# Patient Record
Sex: FEMALE | Race: WHITE | NOT HISPANIC OR LATINO | Employment: UNEMPLOYED | URBAN - METROPOLITAN AREA
[De-identification: names, ages, dates, MRNs, and addresses within clinical notes are randomized per-mention and may not be internally consistent; named-entity substitution may affect disease eponyms.]

---

## 2017-06-14 ENCOUNTER — INITIAL PRENATAL (OUTPATIENT)
Dept: OBSTETRICS AND GYNECOLOGY | Facility: CLINIC | Age: 27
End: 2017-06-14

## 2017-06-14 ENCOUNTER — PROCEDURE VISIT (OUTPATIENT)
Dept: OBSTETRICS AND GYNECOLOGY | Facility: CLINIC | Age: 27
End: 2017-06-14

## 2017-06-14 VITALS — SYSTOLIC BLOOD PRESSURE: 98 MMHG | DIASTOLIC BLOOD PRESSURE: 70 MMHG | WEIGHT: 169.8 LBS

## 2017-06-14 DIAGNOSIS — O36.80X0 ENCOUNTER TO DETERMINE FETAL VIABILITY OF PREGNANCY, NOT APPLICABLE OR UNSPECIFIED FETUS: Primary | ICD-10-CM

## 2017-06-14 DIAGNOSIS — Z34.82 PRENATAL CARE, SUBSEQUENT PREGNANCY, SECOND TRIMESTER: Primary | ICD-10-CM

## 2017-06-14 DIAGNOSIS — Z3A.13 13 WEEKS GESTATION OF PREGNANCY: ICD-10-CM

## 2017-06-14 PROCEDURE — 99204 OFFICE O/P NEW MOD 45 MIN: CPT | Performed by: OBSTETRICS & GYNECOLOGY

## 2017-06-14 PROCEDURE — 76801 OB US < 14 WKS SINGLE FETUS: CPT | Performed by: OBSTETRICS & GYNECOLOGY

## 2017-06-14 RX ORDER — VITAMIN A, ASCORBIC ACID, VITAMIN D, .ALPHA.-TOCOPHEROL, THIAMINE MONONITRATE, RIBOFLAVIN, NIACIN, PYRIDOXINE HYDROCHLORIDE, FOLIC ACID, CYANOCOBALAMIN, IODINE, IRON, MAGNESIUM, ZINC, COPPER, DOCONEXENT, AND DOCUSATE SODIUM
1 KIT DAILY
Qty: 30 EACH | Refills: 11 | Status: SHIPPED | OUTPATIENT
Start: 2017-06-14 | End: 2017-08-04 | Stop reason: ALTCHOICE

## 2017-06-14 NOTE — PROGRESS NOTES
CC: Ms. Dallas is a 27-year-old  3 para 2 who presents at 13-6/7 weeks for an initial OB visit    Assessment and plan    1.  Initial OB visit.  Counseled regarding weight gain, genetic screening, avoidance of Zika virus.  30 minutes of a 45 minute visit were spent in direct face-to-face counseling  2 the patient would like to do genetic screening.  She would like to defer this screening as well as her prenatal labs to the next visit.  3.  Delivery note from the previous delivery was reviewed.

## 2017-06-16 LAB
Lab: NORMAL
REQUEST PROBLEM: NORMAL

## 2017-06-21 LAB
C TRACH RRNA CVX QL NAA+PROBE: NEGATIVE
CYTOLOGIST CVX/VAG CYTO: ABNORMAL
CYTOLOGY CVX/VAG DOC THIN PREP: ABNORMAL
DX ICD CODE: ABNORMAL
DX ICD CODE: ABNORMAL
HIV 1 & 2 AB SER-IMP: ABNORMAL
HPV I/H RISK 4 DNA CVX QL PROBE+SIG AMP: POSITIVE
N GONORRHOEA RRNA CVX QL NAA+PROBE: NEGATIVE
OTHER STN SPEC: ABNORMAL
PATH REPORT.FINAL DX SPEC: ABNORMAL
PATHOLOGIST CVX/VAG CYTO: ABNORMAL
RECOM F/U CVX/VAG CYTO: ABNORMAL
STAT OF ADQ CVX/VAG CYTO-IMP: ABNORMAL
T VAGINALIS RRNA SPEC QL NAA+PROBE: NEGATIVE

## 2017-06-23 ENCOUNTER — TELEPHONE (OUTPATIENT)
Dept: OBSTETRICS AND GYNECOLOGY | Facility: CLINIC | Age: 27
End: 2017-06-23

## 2017-06-23 NOTE — TELEPHONE ENCOUNTER
----- Message from Rolando Cunha MD sent at 6/22/2017 12:15 PM EDT -----  The patient needs a colposcopy.  I will discuss this with her at her next OB visit.

## 2017-06-28 ENCOUNTER — TELEPHONE (OUTPATIENT)
Dept: OBSTETRICS AND GYNECOLOGY | Facility: CLINIC | Age: 27
End: 2017-06-28

## 2017-07-18 ENCOUNTER — ROUTINE PRENATAL (OUTPATIENT)
Dept: OBSTETRICS AND GYNECOLOGY | Facility: CLINIC | Age: 27
End: 2017-07-18

## 2017-07-18 VITALS — WEIGHT: 175.8 LBS | SYSTOLIC BLOOD PRESSURE: 110 MMHG | DIASTOLIC BLOOD PRESSURE: 70 MMHG

## 2017-07-18 DIAGNOSIS — Z34.82 PRENATAL CARE, SUBSEQUENT PREGNANCY, SECOND TRIMESTER: Primary | ICD-10-CM

## 2017-07-18 LAB
BILIRUB BLD-MCNC: NEGATIVE MG/DL
GLUCOSE UR STRIP-MCNC: NEGATIVE MG/DL
KETONES UR QL: NEGATIVE
LEUKOCYTE EST, POC: NEGATIVE
NITRITE UR-MCNC: NEGATIVE MG/ML
PH UR: 7.5 [PH] (ref 5–8)
PROT UR STRIP-MCNC: NEGATIVE MG/DL
RBC # UR STRIP: NEGATIVE /UL
SP GR UR: 1 (ref 1–1.03)
UROBILINOGEN UR QL: NORMAL

## 2017-07-18 PROCEDURE — 99212 OFFICE O/P EST SF 10 MIN: CPT | Performed by: OBSTETRICS & GYNECOLOGY

## 2017-07-18 NOTE — PROGRESS NOTES
CC: Ms. Dallas is a 26-year-old  3 para 2 who presents for a routine prenatal visit at 18-5/7 weeks.  She is feeling well.  She has begun to appreciate fetal movement.    Assessment and plan 1.  Intrauterine pregnancy at 18-5/7 weeks.  Appropriate progress  2.  Screening ultrasound at the next visit  3.  Prenatal labs today  4.  The patient has changed her mind and now declines genetic screening.  Counseled.

## 2017-07-19 LAB
ABO GROUP BLD: (no result)
BASOPHILS # BLD AUTO: 0.1 X10E3/UL (ref 0–0.2)
BASOPHILS NFR BLD AUTO: 1 %
BLD GP AB SCN SERPL QL: NEGATIVE
EOSINOPHIL # BLD AUTO: 0.5 X10E3/UL (ref 0–0.4)
EOSINOPHIL NFR BLD AUTO: 6 %
ERYTHROCYTE [DISTWIDTH] IN BLOOD BY AUTOMATED COUNT: 13.3 % (ref 12.3–15.4)
HBV SURFACE AG SERPL QL IA: NEGATIVE
HCT VFR BLD AUTO: 32.5 % (ref 34–46.6)
HGB BLD-MCNC: 10.5 G/DL (ref 11.1–15.9)
HIV 1+2 AB+HIV1 P24 AG SERPL QL IA: NON REACTIVE
IMM GRANULOCYTES # BLD: 0 X10E3/UL (ref 0–0.1)
IMM GRANULOCYTES NFR BLD: 0 %
LYMPHOCYTES # BLD AUTO: 2.5 X10E3/UL (ref 0.7–3.1)
LYMPHOCYTES NFR BLD AUTO: 27 %
MCH RBC QN AUTO: 30.4 PG (ref 26.6–33)
MCHC RBC AUTO-ENTMCNC: 32.3 G/DL (ref 31.5–35.7)
MCV RBC AUTO: 94 FL (ref 79–97)
MONOCYTES # BLD AUTO: 0.6 X10E3/UL (ref 0.1–0.9)
MONOCYTES NFR BLD AUTO: 6 %
NEUTROPHILS # BLD AUTO: 5.6 X10E3/UL (ref 1.4–7)
NEUTROPHILS NFR BLD AUTO: 60 %
PLATELET # BLD AUTO: 373 X10E3/UL (ref 150–379)
RBC # BLD AUTO: 3.45 X10E6/UL (ref 3.77–5.28)
RH BLD: POSITIVE
RPR SER QL: NON REACTIVE
RUBV IGG SERPL IA-ACNC: 1.27 INDEX
WBC # BLD AUTO: 9.4 X10E3/UL (ref 3.4–10.8)

## 2017-08-04 ENCOUNTER — PROCEDURE VISIT (OUTPATIENT)
Dept: OBSTETRICS AND GYNECOLOGY | Facility: CLINIC | Age: 27
End: 2017-08-04

## 2017-08-04 ENCOUNTER — ROUTINE PRENATAL (OUTPATIENT)
Dept: OBSTETRICS AND GYNECOLOGY | Facility: CLINIC | Age: 27
End: 2017-08-04

## 2017-08-04 VITALS — SYSTOLIC BLOOD PRESSURE: 110 MMHG | WEIGHT: 183 LBS | DIASTOLIC BLOOD PRESSURE: 70 MMHG

## 2017-08-04 DIAGNOSIS — Z36.3 ANTENATAL SCREENING FOR MALFORMATION USING ULTRASONICS: Primary | ICD-10-CM

## 2017-08-04 DIAGNOSIS — Z34.82 PRENATAL CARE, SUBSEQUENT PREGNANCY, SECOND TRIMESTER: Primary | ICD-10-CM

## 2017-08-04 LAB
BILIRUB BLD-MCNC: NEGATIVE MG/DL
GLUCOSE UR STRIP-MCNC: NEGATIVE MG/DL
KETONES UR QL: NEGATIVE
PROT UR STRIP-MCNC: NEGATIVE MG/DL

## 2017-08-04 PROCEDURE — 76805 OB US >/= 14 WKS SNGL FETUS: CPT | Performed by: OBSTETRICS & GYNECOLOGY

## 2017-08-04 PROCEDURE — 99212 OFFICE O/P EST SF 10 MIN: CPT | Performed by: OBSTETRICS & GYNECOLOGY

## 2017-08-04 RX ORDER — MULTIVIT-MIN69/IRON/FOLIC ACID 50-1.25 MG
1 TABLET ORAL DAILY
Qty: 30 EACH | Refills: 11 | Status: SHIPPED | OUTPATIENT
Start: 2017-08-04

## 2017-08-04 NOTE — PROGRESS NOTES
CC: 21w1d IUP  Fetus active  VS reviewed and WNL  [place] screen reassuring  A: 2nd trimester IUP-stable  P: GTS next mo  Rx Prenate Elite

## 2017-09-25 ENCOUNTER — ROUTINE PRENATAL (OUTPATIENT)
Dept: OBSTETRICS AND GYNECOLOGY | Facility: CLINIC | Age: 27
End: 2017-09-25

## 2017-09-25 VITALS
BODY MASS INDEX: 31.49 KG/M2 | WEIGHT: 189 LBS | HEIGHT: 65 IN | SYSTOLIC BLOOD PRESSURE: 110 MMHG | DIASTOLIC BLOOD PRESSURE: 64 MMHG

## 2017-09-25 DIAGNOSIS — Z34.83 PRENATAL CARE, SUBSEQUENT PREGNANCY, THIRD TRIMESTER: Primary | ICD-10-CM

## 2017-09-25 PROCEDURE — 99212 OFFICE O/P EST SF 10 MIN: CPT | Performed by: OBSTETRICS & GYNECOLOGY

## 2017-09-26 LAB
BASOPHILS # BLD AUTO: 0.05 10*3/MM3 (ref 0–0.2)
BASOPHILS NFR BLD AUTO: 0.4 % (ref 0–1.5)
BLD GP AB SCN SERPL QL: NEGATIVE
EOSINOPHIL # BLD AUTO: 0.43 10*3/MM3 (ref 0–0.7)
EOSINOPHIL NFR BLD AUTO: 3.4 % (ref 0.3–6.2)
ERYTHROCYTE [DISTWIDTH] IN BLOOD BY AUTOMATED COUNT: 12.9 % (ref 11.7–13)
GLUCOSE 1H P 50 G GLC PO SERPL-MCNC: 120 MG/DL (ref 65–139)
HCT VFR BLD AUTO: 29.9 % (ref 35.6–45.5)
HGB BLD-MCNC: 10.1 G/DL (ref 11.9–15.5)
IMM GRANULOCYTES # BLD: 0.06 10*3/MM3 (ref 0–0.03)
IMM GRANULOCYTES NFR BLD: 0.5 % (ref 0–0.5)
LYMPHOCYTES # BLD AUTO: 2.88 10*3/MM3 (ref 0.9–4.8)
LYMPHOCYTES NFR BLD AUTO: 22.8 % (ref 19.6–45.3)
MCH RBC QN AUTO: 31.4 PG (ref 26.9–32)
MCHC RBC AUTO-ENTMCNC: 33.8 G/DL (ref 32.4–36.3)
MCV RBC AUTO: 92.9 FL (ref 80.5–98.2)
MONOCYTES # BLD AUTO: 0.78 10*3/MM3 (ref 0.2–1.2)
MONOCYTES NFR BLD AUTO: 6.2 % (ref 5–12)
NEUTROPHILS # BLD AUTO: 8.42 10*3/MM3 (ref 1.9–8.1)
NEUTROPHILS NFR BLD AUTO: 66.7 % (ref 42.7–76)
NRBC BLD AUTO-RTO: 0 /100 WBC (ref 0–0)
PLATELET # BLD AUTO: 378 10*3/MM3 (ref 140–500)
RBC # BLD AUTO: 3.22 10*6/MM3 (ref 3.9–5.2)
WBC # BLD AUTO: 12.62 10*3/MM3 (ref 4.5–10.7)

## 2017-11-02 ENCOUNTER — ROUTINE PRENATAL (OUTPATIENT)
Dept: OBSTETRICS AND GYNECOLOGY | Facility: CLINIC | Age: 27
End: 2017-11-02

## 2017-11-02 VITALS — DIASTOLIC BLOOD PRESSURE: 70 MMHG | BODY MASS INDEX: 33.28 KG/M2 | SYSTOLIC BLOOD PRESSURE: 120 MMHG | WEIGHT: 200 LBS

## 2017-11-02 DIAGNOSIS — Z34.83 SUPERVISION OF NORMAL INTRAUTERINE PREGNANCY IN MULTIGRAVIDA, THIRD TRIMESTER: Primary | ICD-10-CM

## 2017-11-02 PROCEDURE — 99212 OFFICE O/P EST SF 10 MIN: CPT | Performed by: OBSTETRICS & GYNECOLOGY

## 2017-11-02 NOTE — PROGRESS NOTES
CC: 34 wk IUP  Fetus active  No ctx  VS reviewed  11# wt gain-disc  A: 3rd trimester IUP  Excessive wt gain  P: disc diet  Growth scan 2 weeks

## 2017-11-12 ENCOUNTER — HOSPITAL ENCOUNTER (INPATIENT)
Facility: HOSPITAL | Age: 27
LOS: 2 days | Discharge: HOME | End: 2017-11-14
Attending: OBSTETRICS & GYNECOLOGY | Admitting: OBSTETRICS & GYNECOLOGY

## 2017-11-12 ENCOUNTER — ANESTHESIA EVENT (OUTPATIENT)
Dept: LABOR AND DELIVERY | Facility: HOSPITAL | Age: 27
End: 2017-11-12

## 2017-11-12 ENCOUNTER — ANESTHESIA (OUTPATIENT)
Dept: LABOR AND DELIVERY | Facility: HOSPITAL | Age: 27
End: 2017-11-12

## 2017-11-12 ENCOUNTER — APPOINTMENT (OUTPATIENT)
Dept: ULTRASOUND IMAGING | Facility: HOSPITAL | Age: 27
End: 2017-11-12

## 2017-11-12 PROBLEM — O09.33 INSUFFICIENT PRENATAL CARE IN THIRD TRIMESTER: Status: ACTIVE | Noted: 2017-11-12

## 2017-11-12 PROBLEM — O99.333 TOBACCO SMOKING AFFECTING PREGNANCY IN THIRD TRIMESTER: Status: ACTIVE | Noted: 2017-11-12

## 2017-11-12 PROBLEM — Z34.90 PREGNANCY: Status: RESOLVED | Noted: 2017-11-12 | Resolved: 2017-11-12

## 2017-11-12 PROBLEM — O99.323 DRUG USE AFFECTING PREGNANCY IN THIRD TRIMESTER: Status: ACTIVE | Noted: 2017-11-12

## 2017-11-12 PROBLEM — Z34.90 PREGNANCY: Status: ACTIVE | Noted: 2017-11-12

## 2017-11-12 PROBLEM — O46.93 VAGINAL BLEEDING IN PREGNANCY, THIRD TRIMESTER: Status: ACTIVE | Noted: 2017-11-12

## 2017-11-12 LAB
A1 MICROGLOB PLACENTAL VAG QL: POSITIVE
ABO GROUP BLD: NORMAL
AMPHET+METHAMPHET UR QL: NEGATIVE
ATMOSPHERIC PRESS: 759.4 MMHG
ATMOSPHERIC PRESS: 763.4 MMHG
BARBITURATES UR QL SCN: NEGATIVE
BASE EXCESS BLDCOA CALC-SCNC: -5.8 MMOL/L
BASE EXCESS BLDCOV CALC-SCNC: -5.1 MMOL/L (ref -30–30)
BASOPHILS # BLD AUTO: 0.05 10*3/MM3 (ref 0–0.2)
BASOPHILS NFR BLD AUTO: 0.2 % (ref 0–1.5)
BDY SITE: ABNORMAL
BDY SITE: ABNORMAL
BENZODIAZ UR QL SCN: NEGATIVE
BLD GP AB SCN SERPL QL: NEGATIVE
CANNABINOIDS SERPL QL: POSITIVE
COCAINE UR QL: NEGATIVE
DEPRECATED RDW RBC AUTO: 45.6 FL (ref 37–54)
EOSINOPHIL # BLD AUTO: 0.39 10*3/MM3 (ref 0–0.7)
EOSINOPHIL NFR BLD AUTO: 1.9 % (ref 0.3–6.2)
ERYTHROCYTE [DISTWIDTH] IN BLOOD BY AUTOMATED COUNT: 13.1 % (ref 11.7–13)
HCO3 BLDCOA-SCNC: 23.4 MMOL/L (ref 22–28)
HCO3 BLDCOV-SCNC: 22 MMOL/L
HCT VFR BLD AUTO: 34.3 % (ref 35.6–45.5)
HGB BLD-MCNC: 11.2 G/DL (ref 11.9–15.5)
HOROWITZ INDEX BLD+IHG-RTO: 21 %
HOROWITZ INDEX BLD+IHG-RTO: 21 %
IMM GRANULOCYTES # BLD: 0.2 10*3/MM3 (ref 0–0.03)
IMM GRANULOCYTES NFR BLD: 1 % (ref 0–0.5)
LYMPHOCYTES # BLD AUTO: 3.49 10*3/MM3 (ref 0.9–4.8)
LYMPHOCYTES NFR BLD AUTO: 17.2 % (ref 19.6–45.3)
MCH RBC QN AUTO: 31.5 PG (ref 26.9–32)
MCHC RBC AUTO-ENTMCNC: 32.7 G/DL (ref 32.4–36.3)
MCV RBC AUTO: 96.6 FL (ref 80.5–98.2)
METHADONE UR QL SCN: NEGATIVE
MODALITY: ABNORMAL
MODALITY: ABNORMAL
MONOCYTES # BLD AUTO: 1.2 10*3/MM3 (ref 0.2–1.2)
MONOCYTES NFR BLD AUTO: 5.9 % (ref 5–12)
NEUTROPHILS # BLD AUTO: 14.95 10*3/MM3 (ref 1.9–8.1)
NEUTROPHILS NFR BLD AUTO: 73.8 % (ref 42.7–76)
NRBC BLD MANUAL-RTO: 0 /100 WBC (ref 0–0)
OPIATES UR QL: NEGATIVE
OXYCODONE UR QL SCN: NEGATIVE
PCO2 BLDCOA: 58.8 MMHG
PCO2 BLDCOV: 47.2 MM HG (ref 35–51.3)
PH BLDCOA: 7.21 PH UNITS (ref 7.18–7.34)
PH BLDCOV: 7.28 PH UNITS (ref 7.26–7.4)
PLAT MORPH BLD: NORMAL
PLATELET # BLD AUTO: 342 10*3/MM3 (ref 140–500)
PMV BLD AUTO: 9.5 FL (ref 6–12)
PO2 BLDCOA: <25.2 MMHG (ref 12–26)
PO2 BLDCOV: 25.7 MM HG (ref 19–39)
RBC # BLD AUTO: 3.55 10*6/MM3 (ref 3.9–5.2)
RBC MORPH BLD: NORMAL
RH BLD: POSITIVE
SAO2 % BLDCOA: 22.5 % (ref 92–99)
SAO2 % BLDCOA: 39 % (ref 92–99)
SAO2 % BLDCOV: ABNORMAL %
WBC MORPH BLD: NORMAL
WBC NRBC COR # BLD: 20.28 10*3/MM3 (ref 4.5–10.7)

## 2017-11-12 PROCEDURE — 80307 DRUG TEST PRSMV CHEM ANLYZR: CPT | Performed by: OBSTETRICS & GYNECOLOGY

## 2017-11-12 PROCEDURE — 59409 OBSTETRICAL CARE: CPT | Performed by: OBSTETRICS & GYNECOLOGY

## 2017-11-12 PROCEDURE — 86901 BLOOD TYPING SEROLOGIC RH(D): CPT | Performed by: OBSTETRICS & GYNECOLOGY

## 2017-11-12 PROCEDURE — 82803 BLOOD GASES ANY COMBINATION: CPT

## 2017-11-12 PROCEDURE — 76819 FETAL BIOPHYS PROFIL W/O NST: CPT

## 2017-11-12 PROCEDURE — 85007 BL SMEAR W/DIFF WBC COUNT: CPT | Performed by: OBSTETRICS & GYNECOLOGY

## 2017-11-12 PROCEDURE — 76805 OB US >/= 14 WKS SNGL FETUS: CPT

## 2017-11-12 PROCEDURE — 25010000002 BETAMETHASONE ACET & SOD PHOS PER 4 MG: Performed by: OBSTETRICS & GYNECOLOGY

## 2017-11-12 PROCEDURE — 86850 RBC ANTIBODY SCREEN: CPT | Performed by: OBSTETRICS & GYNECOLOGY

## 2017-11-12 PROCEDURE — 25010000002 VANCOMYCIN PER 500 MG: Performed by: OBSTETRICS & GYNECOLOGY

## 2017-11-12 PROCEDURE — S0260 H&P FOR SURGERY: HCPCS | Performed by: OBSTETRICS & GYNECOLOGY

## 2017-11-12 PROCEDURE — 88307 TISSUE EXAM BY PATHOLOGIST: CPT

## 2017-11-12 PROCEDURE — 85025 COMPLETE CBC W/AUTO DIFF WBC: CPT | Performed by: OBSTETRICS & GYNECOLOGY

## 2017-11-12 PROCEDURE — 86900 BLOOD TYPING SEROLOGIC ABO: CPT | Performed by: OBSTETRICS & GYNECOLOGY

## 2017-11-12 PROCEDURE — 84112 EVAL AMNIOTIC FLUID PROTEIN: CPT | Performed by: OBSTETRICS & GYNECOLOGY

## 2017-11-12 PROCEDURE — C1755 CATHETER, INTRASPINAL: HCPCS | Performed by: ANESTHESIOLOGY

## 2017-11-12 RX ORDER — METHYLERGONOVINE MALEATE 0.2 MG/ML
200 INJECTION INTRAVENOUS ONCE AS NEEDED
Status: DISCONTINUED | OUTPATIENT
Start: 2017-11-12 | End: 2017-11-12 | Stop reason: HOSPADM

## 2017-11-12 RX ORDER — OXYTOCIN/RINGER'S LACTATE 10/500ML
999 PLASTIC BAG, INJECTION (ML) INTRAVENOUS ONCE
Status: COMPLETED | OUTPATIENT
Start: 2017-11-12 | End: 2017-11-12

## 2017-11-12 RX ORDER — DOCUSATE SODIUM 100 MG/1
100 CAPSULE, LIQUID FILLED ORAL 2 TIMES DAILY
Status: DISCONTINUED | OUTPATIENT
Start: 2017-11-13 | End: 2017-11-14 | Stop reason: HOSPADM

## 2017-11-12 RX ORDER — ERYTHROMYCIN 5 MG/G
OINTMENT OPHTHALMIC
Status: DISPENSED
Start: 2017-11-12 | End: 2017-11-13

## 2017-11-12 RX ORDER — ACETAMINOPHEN 325 MG/1
650 TABLET ORAL EVERY 4 HOURS PRN
Status: DISCONTINUED | OUTPATIENT
Start: 2017-11-12 | End: 2017-11-14 | Stop reason: HOSPADM

## 2017-11-12 RX ORDER — PHYTONADIONE 1 MG/.5ML
INJECTION, EMULSION INTRAMUSCULAR; INTRAVENOUS; SUBCUTANEOUS
Status: DISPENSED
Start: 2017-11-12 | End: 2017-11-13

## 2017-11-12 RX ORDER — ONDANSETRON 2 MG/ML
4 INJECTION INTRAMUSCULAR; INTRAVENOUS EVERY 6 HOURS PRN
Status: DISCONTINUED | OUTPATIENT
Start: 2017-11-12 | End: 2017-11-14 | Stop reason: HOSPADM

## 2017-11-12 RX ORDER — CALCIUM CARBONATE 200(500)MG
2 TABLET,CHEWABLE ORAL 3 TIMES DAILY PRN
Status: DISCONTINUED | OUTPATIENT
Start: 2017-11-12 | End: 2017-11-14 | Stop reason: HOSPADM

## 2017-11-12 RX ORDER — OXYTOCIN/RINGER'S LACTATE 10/500ML
2-20 PLASTIC BAG, INJECTION (ML) INTRAVENOUS
Status: DISCONTINUED | OUTPATIENT
Start: 2017-11-12 | End: 2017-11-13

## 2017-11-12 RX ORDER — OXYTOCIN/RINGER'S LACTATE 10/500ML
999 PLASTIC BAG, INJECTION (ML) INTRAVENOUS ONCE
Status: DISCONTINUED | OUTPATIENT
Start: 2017-11-12 | End: 2017-11-14 | Stop reason: HOSPADM

## 2017-11-12 RX ORDER — OXYTOCIN/RINGER'S LACTATE 10/500ML
125 PLASTIC BAG, INJECTION (ML) INTRAVENOUS CONTINUOUS PRN
Status: ACTIVE | OUTPATIENT
Start: 2017-11-12 | End: 2017-11-13

## 2017-11-12 RX ORDER — FAMOTIDINE 10 MG/ML
20 INJECTION, SOLUTION INTRAVENOUS ONCE AS NEEDED
Status: DISCONTINUED | OUTPATIENT
Start: 2017-11-12 | End: 2017-11-12 | Stop reason: HOSPADM

## 2017-11-12 RX ORDER — BISACODYL 10 MG
10 SUPPOSITORY, RECTAL RECTAL DAILY PRN
Status: DISCONTINUED | OUTPATIENT
Start: 2017-11-13 | End: 2017-11-14 | Stop reason: HOSPADM

## 2017-11-12 RX ORDER — EPHEDRINE SULFATE 50 MG/ML
5 INJECTION, SOLUTION INTRAVENOUS AS NEEDED
Status: DISCONTINUED | OUTPATIENT
Start: 2017-11-12 | End: 2017-11-12 | Stop reason: HOSPADM

## 2017-11-12 RX ORDER — MISOPROSTOL 200 UG/1
800 TABLET ORAL AS NEEDED
Status: DISCONTINUED | OUTPATIENT
Start: 2017-11-12 | End: 2017-11-12 | Stop reason: HOSPADM

## 2017-11-12 RX ORDER — LIDOCAINE HYDROCHLORIDE 10 MG/ML
5 INJECTION, SOLUTION INFILTRATION; PERINEURAL AS NEEDED
Status: DISCONTINUED | OUTPATIENT
Start: 2017-11-12 | End: 2017-11-12 | Stop reason: HOSPADM

## 2017-11-12 RX ORDER — METHYLERGONOVINE MALEATE 0.2 MG/ML
200 INJECTION INTRAVENOUS ONCE
Status: DISCONTINUED | OUTPATIENT
Start: 2017-11-12 | End: 2017-11-14 | Stop reason: HOSPADM

## 2017-11-12 RX ORDER — ONDANSETRON 2 MG/ML
4 INJECTION INTRAMUSCULAR; INTRAVENOUS ONCE AS NEEDED
Status: DISCONTINUED | OUTPATIENT
Start: 2017-11-12 | End: 2017-11-12 | Stop reason: HOSPADM

## 2017-11-12 RX ORDER — SODIUM CHLORIDE 9 MG/ML
999 INJECTION, SOLUTION INTRAVENOUS CONTINUOUS
Status: DISCONTINUED | OUTPATIENT
Start: 2017-11-12 | End: 2017-11-13

## 2017-11-12 RX ORDER — SODIUM CHLORIDE, SODIUM LACTATE, POTASSIUM CHLORIDE, CALCIUM CHLORIDE 600; 310; 30; 20 MG/100ML; MG/100ML; MG/100ML; MG/100ML
125 INJECTION, SOLUTION INTRAVENOUS CONTINUOUS
Status: DISCONTINUED | OUTPATIENT
Start: 2017-11-12 | End: 2017-11-13

## 2017-11-12 RX ORDER — LANOLIN 100 %
OINTMENT (GRAM) TOPICAL
Status: DISCONTINUED | OUTPATIENT
Start: 2017-11-12 | End: 2017-11-14 | Stop reason: HOSPADM

## 2017-11-12 RX ORDER — IBUPROFEN 800 MG/1
800 TABLET ORAL EVERY 8 HOURS SCHEDULED
Status: DISCONTINUED | OUTPATIENT
Start: 2017-11-12 | End: 2017-11-14 | Stop reason: HOSPADM

## 2017-11-12 RX ORDER — PRENATAL VIT NO.126/IRON/FOLIC 28MG-0.8MG
1 TABLET ORAL DAILY
Status: DISCONTINUED | OUTPATIENT
Start: 2017-11-13 | End: 2017-11-14 | Stop reason: HOSPADM

## 2017-11-12 RX ORDER — BETAMETHASONE SODIUM PHOSPHATE AND BETAMETHASONE ACETATE 3; 3 MG/ML; MG/ML
12 INJECTION, SUSPENSION INTRA-ARTICULAR; INTRALESIONAL; INTRAMUSCULAR; SOFT TISSUE EVERY 24 HOURS
Status: DISCONTINUED | OUTPATIENT
Start: 2017-11-12 | End: 2017-11-13

## 2017-11-12 RX ORDER — SODIUM CHLORIDE 0.9 % (FLUSH) 0.9 %
1-10 SYRINGE (ML) INJECTION AS NEEDED
Status: DISCONTINUED | OUTPATIENT
Start: 2017-11-12 | End: 2017-11-12 | Stop reason: HOSPADM

## 2017-11-12 RX ORDER — OXYTOCIN/RINGER'S LACTATE 10/500ML
125 PLASTIC BAG, INJECTION (ML) INTRAVENOUS CONTINUOUS PRN
Status: DISCONTINUED | OUTPATIENT
Start: 2017-11-12 | End: 2017-11-12 | Stop reason: HOSPADM

## 2017-11-12 RX ORDER — HYDROCODONE BITARTRATE AND ACETAMINOPHEN 5; 325 MG/1; MG/1
1 TABLET ORAL EVERY 4 HOURS PRN
Status: DISCONTINUED | OUTPATIENT
Start: 2017-11-12 | End: 2017-11-14 | Stop reason: HOSPADM

## 2017-11-12 RX ORDER — VANCOMYCIN HYDROCHLORIDE 1 G/200ML
1000 INJECTION, SOLUTION INTRAVENOUS EVERY 12 HOURS
Status: DISCONTINUED | OUTPATIENT
Start: 2017-11-12 | End: 2017-11-13

## 2017-11-12 RX ORDER — HYDROCODONE BITARTRATE AND ACETAMINOPHEN 10; 325 MG/1; MG/1
1 TABLET ORAL EVERY 4 HOURS PRN
Status: DISCONTINUED | OUTPATIENT
Start: 2017-11-12 | End: 2017-11-14 | Stop reason: HOSPADM

## 2017-11-12 RX ORDER — SODIUM CHLORIDE 0.9 % (FLUSH) 0.9 %
1-10 SYRINGE (ML) INJECTION AS NEEDED
Status: DISCONTINUED | OUTPATIENT
Start: 2017-11-12 | End: 2017-11-14 | Stop reason: HOSPADM

## 2017-11-12 RX ORDER — DIPHENHYDRAMINE HYDROCHLORIDE 50 MG/ML
12.5 INJECTION INTRAMUSCULAR; INTRAVENOUS EVERY 8 HOURS PRN
Status: DISCONTINUED | OUTPATIENT
Start: 2017-11-12 | End: 2017-11-12 | Stop reason: HOSPADM

## 2017-11-12 RX ORDER — CARBOPROST TROMETHAMINE 250 UG/ML
250 INJECTION, SOLUTION INTRAMUSCULAR AS NEEDED
Status: DISCONTINUED | OUTPATIENT
Start: 2017-11-12 | End: 2017-11-12 | Stop reason: HOSPADM

## 2017-11-12 RX ADMIN — MISOPROSTOL 600 MCG: 200 TABLET ORAL at 20:16

## 2017-11-12 RX ADMIN — BETAMETHASONE SODIUM PHOSPHATE AND BETAMETHASONE ACETATE 12 MG: 3; 3 INJECTION, SUSPENSION INTRA-ARTICULAR; INTRALESIONAL; INTRAMUSCULAR at 09:11

## 2017-11-12 RX ADMIN — SODIUM CHLORIDE, POTASSIUM CHLORIDE, SODIUM LACTATE AND CALCIUM CHLORIDE 125 ML/HR: 600; 310; 30; 20 INJECTION, SOLUTION INTRAVENOUS at 11:23

## 2017-11-12 RX ADMIN — IBUPROFEN 800 MG: 800 TABLET ORAL at 22:50

## 2017-11-12 RX ADMIN — SODIUM CHLORIDE, POTASSIUM CHLORIDE, SODIUM LACTATE AND CALCIUM CHLORIDE 1000 ML: 600; 310; 30; 20 INJECTION, SOLUTION INTRAVENOUS at 15:20

## 2017-11-12 RX ADMIN — VANCOMYCIN HYDROCHLORIDE 1000 MG: 1 INJECTION, SOLUTION INTRAVENOUS at 16:45

## 2017-11-12 RX ADMIN — VANCOMYCIN HYDROCHLORIDE 1000 MG: 1 INJECTION, SOLUTION INTRAVENOUS at 04:52

## 2017-11-12 RX ADMIN — OXYTOCIN 999 ML/HR: 10 INJECTION INTRAVENOUS at 20:39

## 2017-11-12 RX ADMIN — HYDROCODONE BITARTRATE AND ACETAMINOPHEN 1 TABLET: 5; 325 TABLET ORAL at 22:49

## 2017-11-12 RX ADMIN — LIDOCAINE HYDROCHLORIDE 5 ML: 10; .005 INJECTION, SOLUTION EPIDURAL; INFILTRATION; INTRACAUDAL; PERINEURAL at 15:46

## 2017-11-12 RX ADMIN — LIDOCAINE HYDROCHLORIDE 5 ML: 10; .005 INJECTION, SOLUTION EPIDURAL; INFILTRATION; INTRACAUDAL; PERINEURAL at 15:48

## 2017-11-12 RX ADMIN — Medication 10 ML/HR: at 15:48

## 2017-11-12 RX ADMIN — OXYTOCIN 125 ML/HR: 10 INJECTION INTRAVENOUS at 21:10

## 2017-11-12 RX ADMIN — OXYTOCIN 2 MILLI-UNITS/MIN: 10 INJECTION INTRAVENOUS at 13:00

## 2017-11-12 RX ADMIN — SODIUM CHLORIDE, POTASSIUM CHLORIDE, SODIUM LACTATE AND CALCIUM CHLORIDE 125 ML/HR: 600; 310; 30; 20 INJECTION, SOLUTION INTRAVENOUS at 04:32

## 2017-11-12 NOTE — PLAN OF CARE
Problem: Patient Care Overview (Adult)  Goal: Plan of Care Review  Outcome: Ongoing (interventions implemented as appropriate)    17   Coping/Psychosocial Response Interventions   Plan Of Care Reviewed With patient   Patient Care Overview   Progress progress toward functional goals as expected       Goal: Adult Individualization and Mutuality  Outcome: Ongoing (interventions implemented as appropriate)    17   Individualization   Patient Specific Preferences healthy mom and baby   Patient Specific Goals MARY ANN, pain control, breastfeeding   Patient Specific Interventions epidural, monitor for cervical change   Mutuality/Individual Preferences   What Anxieties, Fears or Concerns Do You Have About Your Health or Care? FOB not involved, psychcosocial issues, THC positive, vaginal bleeding   What Questions Do You Have About Your Health or Care? How long will I be in labor?   What Information Would Help Us Give You More Personalized Care? history of cutting and drug abuse       Goal: Discharge Needs Assessment  Outcome: Ongoing (interventions implemented as appropriate)    Problem: Labor (Cervical Ripen, Induct, Augment) (Adult,Obstetrics,Pediatric)  Goal: Signs and Symptoms of Listed Potential Problems Will be Absent or Manageable (Labor)  Outcome: Ongoing (interventions implemented as appropriate)    17   Labor (Cervical Ripen, Induct, Augment)   Problems Assessed (Labor) all   Problems Present (Labor) none         Problem: Fall Risk  (Adult,Obstetrics,Pediatric)  Goal: Identify Related Risk Factors and Signs and Symptoms  Outcome: Ongoing (interventions implemented as appropriate)    17   Fall Risk    Fall Risk: Related Risk Factors regional anesthesia       Goal: Absence of Maternal Fall  Outcome: Ongoing (interventions implemented as appropriate)    17   Fall Risk  (Adult,Obstetrics,Pediatric)   Absence of Maternal Fall making progress  toward outcome       Goal: Absence of  Fall/Drop  Outcome: Ongoing (interventions implemented as appropriate)    17 1711   Fall Risk  (Adult,Obstetrics,Pediatric)   Absence of  Fall/Drop making progress toward outcome         Problem: Anesthesia/Analgesia, Neuraxial (Obstetrics)  Goal: Signs and Symptoms of Listed Potential Problems Will be Absent or Manageable (Anesthesia/Analgesia, Neuraxial)  Outcome: Ongoing (interventions implemented as appropriate)    17 1711   Anesthesia/Analgesia, Neuraxial   Problems Assessed (Neuraxial Anesthesia/Analgesia, OB) all   Problems Present (Neuraxial Anesthesia/Analgesia, OB) none

## 2017-11-12 NOTE — PROGRESS NOTES
Patient reports that she is feeling more painful contractions.  Cervix 3-4 centimeter dilated, 70% effaced, -2 station.  Few small amounts of dark brown clots are noted within the vaginal vault.  /reactive/moderate variability/no decelerations  Tocometry: Contractions every 2-3 minutes.  Plan: Continue with labor augmentation secondary to suspected  premature rupture membranes.  Patient received her first dose of steroids this morning.  She may have an epidural upon request.  Fetal status currently reassuring.  Bleeding of uncertain etiology, suspect bleeding from cervical change and labor versus occult marginal abruption.  Fetal status is reassuring so okay to continue with trial of labor.

## 2017-11-12 NOTE — PLAN OF CARE
Problem: Patient Care Overview (Adult)  Goal: Plan of Care Review  Outcome: Ongoing (interventions implemented as appropriate)    11/12/17 0652   Coping/Psychosocial Response Interventions   Plan Of Care Reviewed With patient   Patient Care Overview   Progress no change       Goal: Adult Individualization and Mutuality  Outcome: Ongoing (interventions implemented as appropriate)    11/12/17 0517 11/12/17 0652   Individualization   Patient Specific Preferences --  Healthy mom and baby   Patient Specific Goals --  Decreased bleeding, r/o ROM, plans to breastfeed, would like MARY ANN at delivery, desires to room in with baby if no NICU stay   Patient Specific Interventions --  Continue to monitor bleeding and any vaginal discharge/fluid loss, monitor for chervical change   Mutuality/Individual Preferences   What Anxieties, Fears or Concerns Do You Have About Your Health or Care? FOB is suicidal- not involved in care, pt is concerned with vaginal bleeding --    What Questions Do You Have About Your Health or Care? Pt asking if her water broke and what would cause the bleeding --    What Information Would Help Us Give You More Personalized Care? pt has history of cutting and drug use --        Goal: Discharge Needs Assessment  Outcome: Ongoing (interventions implemented as appropriate)    11/12/17 0511 11/12/17 0652   Discharge Needs Assessment   Concerns To Be Addressed --  denies needs/concerns at this time   Readmission Within The Last 30 Days --  no previous admission in last 30 days   Equipment Needed After Discharge --  none   Current Discharge Risk --  other (see comments)  (in the process of moving into an apartment )   Discharge Disposition --  home or self-care   Self-Care   Equipment Currently Used at Home --  none   Living Environment   Transportation Available car --          Problem: Labor (Cervical Ripen, Induct, Augment) (Adult,Obstetrics,Pediatric)  Goal: Signs and Symptoms of Listed Potential Problems Will be  Absent or Manageable (Labor)  Outcome: Ongoing (interventions implemented as appropriate)    17   Labor (Cervical Ripen, Induct, Augment)   Problems Assessed (Labor) all   Problems Present (Labor) pain         Problem: Fall Risk  (Adult,Obstetrics,Pediatric)  Goal: Identify Related Risk Factors and Signs and Symptoms  Outcome: Ongoing (interventions implemented as appropriate)    17   Fall Risk    Fall Risk: Related Risk Factors balance change;medical devices;pregnancy weight gain    Fall Risk: Signs and Symptoms presence of fall risk factors       Goal: Absence of Maternal Fall  Outcome: Ongoing (interventions implemented as appropriate)    17   Fall Risk  (Adult,Obstetrics,Pediatric)   Absence of Maternal Fall making progress toward outcome       Goal: Absence of [place] Fall/Drop  Outcome: Ongoing (interventions implemented as appropriate)    17   Fall Risk  (Adult,Obstetrics,Pediatric)   Absence of  Fall/Drop making progress toward outcome

## 2017-11-12 NOTE — ANESTHESIA PROCEDURE NOTES
Labor Epidural    Patient location during procedure: OB  Start Time: 11/12/2017 3:25 PM  Stop Time: 11/12/2017 3:46 PM  Performed By  Anesthesiologist: LUPE RODRIGUEZ  Preanesthetic Checklist  Completed: patient identified, site marked, surgical consent, pre-op evaluation, timeout performed, risks and benefits discussed and monitors and equipment checked  Prep:  Pt Position:sitting  Sterile Tech:cap, gloves, mask and sterile barrier  Prep:chlorhexidine gluconate and isopropyl alcohol  Monitoring:blood pressure monitoring, continuous pulse oximetry and EKG  Epidural Block Procedure:  Approach:midline  Guidance:landmark technique  Location:L3-L4  Needle Type:Tuohy  Needle Gauge:17  Loss of Resistance Medium: saline  Paresthesia: none  Aspiration:negative  Test Dose:negative  Number of Attempts: 1  Post Assessment:  Dressing:occlusive dressing applied and secured with tape  Pt Tolerance:patient tolerated the procedure well with no apparent complications  Complications:no

## 2017-11-12 NOTE — H&P
[facility]  Obstetric History and Physical    Chief Complaint   Patient presents with   • Rupture of Membranes     Gush of fluid at 0300 with bright red bleeding that has continued, denies recent intercourses, good fetal movement, denies trauma to abdomen, denies compliations           Patient is a 27 y.o. female  currently at 35w3d, who presents with gush of fluid.    Her prenatal care was with Dr. Barnett and was uncomplicated.  At 3 AM today with what felt like gush of fluid, but on inspection noted dark bloody fluid.  No abdominal pain, good fetal movement.              Obstetric History       T2      L2     SAB0   TAB0   Ectopic0   Multiple0   Live Births0       # Outcome Date GA Lbr Wilson/2nd Weight Sex Delivery Anes PTL Lv   3 Current            2 Term            1 Term               Obstetric Comments    x 2.  Largest 8 lb 13oz.  I have reviewed the delivery note.  The delivery was uncomplicated, but the baby did sustain a clavicular fracture.  This has healed completely          History reviewed. No pertinent past medical history.     History reviewed. No pertinent surgical history.       No current facility-administered medications on file prior to encounter.      Current Outpatient Prescriptions on File Prior to Encounter   Medication Sig Dispense Refill   • Prenatal Vit-Iron Carbonyl-FA (ELITE-OB) 50-1.25 MG tablet Take 1 tablet by mouth Daily. 30 each 11          Allergies   Allergen Reactions   • Keflex [Cephalexin]           Social History     Social History   • Marital status: Single     Spouse name: N/A   • Number of children: N/A   • Years of education: N/A     Occupational History   • Not on file.     Social History Main Topics   • Smoking status: Light Tobacco Smoker   • Smokeless tobacco: Never Used   • Alcohol use No   • Drug use: No   • Sexual activity: Yes     Partners: Male     Other Topics Concern   • Not on file     Social History Narrative        History  "reviewed. No pertinent family history.     Review of Systems   Constitutional: Negative for chills and fever.   Gastrointestinal: Positive for abdominal pain.   Genitourinary: Positive for pelvic pain and vaginal bleeding. Negative for vaginal discharge.   All other systems reviewed and are negative.      /65  Pulse 78  Temp 97.8 °F (36.6 °C) (Oral)   Resp 16  Ht 65\" (165.1 cm)  Wt 204 lb 9.6 oz (92.8 kg)  LMP 03/03/2017 (Approximate)  BMI 34.05 kg/m2    Physical Exam   Constitutional: She is oriented to person, place, and time. She appears well-developed and well-nourished.   HENT:   Head: Normocephalic and atraumatic.   Eyes: Conjunctivae are normal.   Neck: Normal range of motion. Neck supple. No thyromegaly present.   Cardiovascular: Normal rate, regular rhythm and normal heart sounds.    No murmur heard.  Pulmonary/Chest: Effort normal and breath sounds normal.   Abdominal: Soft. Bowel sounds are normal.   Genitourinary: Pelvic exam was performed with patient supine. There is no lesion on the right labia. There is no lesion on the left labia. Uterus is enlarged (EFW 6# Cephalic ). Cervix exhibits no motion tenderness (1/50/-2). Right adnexum displays no mass. Left adnexum displays no mass. There is bleeding (Dark bloody fluid on exam - not able to produce gush with lifting head ) in the vagina. Vaginal discharge found.   Musculoskeletal: She exhibits no edema.   Lymphadenopathy:        Right: No inguinal adenopathy present.        Left: No inguinal adenopathy present.   Neurological: She is alert and oriented to person, place, and time.   Skin: No rash noted.   Psychiatric: She has a normal mood and affect. Her behavior is normal.       Presentation: Cephalic    Cervix: Exam by: Method: sterile exam per RN (No fluid return with SVE, fetal had bollatable)   Dilation: Dilation: 1.5   Effacement: Cervical Effacement: 60%   Station: Station: -2     FHT - Reassuring, class 1  [place] - q 2-3 min     Lab " Results   Component Value Date    WBC 12.62 (H) 2017    HGB 10.1 (L) 2017    HCT 29.9 (L) 2017    MCV 92.9 2017     2017       Principal Problem:     premature rupture of membranes (PPROM) with onset of labor within 24 hours of rupture in third trimester, antepartum  Active Problems:    Pregnancy      Assessment & Plan    Assessment:  1. Pregnancy at 35w3d  2. Bleeding in third trimester - Unsure if PPROM or marginal separation (Nitrazine positive could be false + and amnisure also positive, but lab also felt could be false positive).    3. GBS unknown, allergy to cephalosporins (Hives) and not sure about ability to use penicillins. Given unknown nature, going to cover with vancomycin.   4. Fetal status reassuring.     Plan:  1. Discussed with her and her mother.   Given presentation likely PPROM but could be a mild marginal separation. So going to allow to labor if in labor, if not laboring or still unknown this morning, can get LINO (growth, BPP, look at placenta) from [facility] to see if that shows evidence of rupture or not. If kicks into labor with cervical change or PPROM becomes obvious will continue with labor protocols.    2. Plan of care has been reviewed with patient and family.   3.  Risks, benefits of treatment plan have been discussed.  4.  All questions have been answered.        Cody Jarquin MD  2017  4:53 AM

## 2017-11-12 NOTE — PROGRESS NOTES
Subjective: Patient still reports painful contractions every 5 minutes or so.  She still having some light to moderate vaginal bleeding but mostly appears dark Brown.    O:  Vitals:    11/12/17 0922 11/12/17 1000 11/12/17 1050 11/12/17 1100   BP: 93/48 109/50 126/57    BP Location:       Patient Position:       Pulse: 74 69 79    Resp:       Temp:    97.8 °F (36.6 °C)   TempSrc:       SpO2:       Weight:       Height:       General: No acute distress, awake and oriented ×3  Lungs: Clear to auscultation bilaterally  Cardiovascular: Regular rate and rhythm  Abdomen: Soft, nontender, nondistended, normoactive bowel sounds, no fundal tenderness  Speculum exam: some mild clear fluid is noted; but negative valsalva. There is light amount of dark brown blood; Fern is equivocal  Cvx: 2.5 cm/60/-2 on my exam  Extremities: No Tenderness, no Homans sign, trace lower extremity edema    NST: 130, Reactive, moderate variability, no decelerations  Tocometry: Contractions about every 2-5 minutes    Lab Results (last 24 hours)     Procedure Component Value Units Date/Time    CBC Auto Differential [[id number]]  (Abnormal) Collected:  11/12/17 0428    Specimen:  Blood from Arm, Left Updated:  11/12/17 0457     WBC 20.28 (H) 10*3/mm3      RBC 3.55 (L) 10*6/mm3      Hemoglobin 11.2 (L) g/dL      Hematocrit 34.3 (L) %      MCV 96.6 fL      MCH 31.5 pg      MCHC 32.7 g/dL      RDW 13.1 (H) %      RDW-SD 45.6 fl      MPV 9.5 fL      Platelets 342 10*3/mm3      Neutrophil % 73.8 %      Lymphocyte % 17.2 (L) %      Monocyte % 5.9 %      Eosinophil % 1.9 %      Basophil % 0.2 %      Immature Grans % 1.0 (H) %      Neutrophils, Absolute 14.95 (H) 10*3/mm3      Lymphocytes, Absolute 3.49 10*3/mm3      Monocytes, Absolute 1.20 10*3/mm3      Eosinophils, Absolute 0.39 10*3/mm3      Basophils, Absolute 0.05 10*3/mm3      Immature Grans, Absolute 0.20 (H) 10*3/mm3      nRBC 0.0 /100 WBC     CBC & Differential [[id number]] Collected:  11/12/17 0428     Specimen:  Blood Updated:  11/12/17 0457    Narrative:       The following orders were created for panel order CBC & Differential.  Procedure                               Abnormality         Status                     ---------                               -----------         ------                     Scan Slide[[id number]]                   Normal              Final result               CBC Auto Differential[[id number]]        Abnormal            Final result                 Please view results for these tests on the individual orders.    Scan Slide [[id number]]  (Normal) Collected:  11/12/17 0428    Specimen:  Blood from Arm, Left Updated:  11/12/17 0457     RBC Morphology Normal     WBC Morphology Normal     Platelet Morphology Normal    PAMG-1, Rapid Assay For ROM - Amniotic Fluid, [[id number]]  (Abnormal) Collected:  11/12/17 0425    Specimen:  Amniotic Fluid Updated:  11/12/17 0504     Rupture of Membranes Positive (C)    Urine Drug Screen - Urine, Clean Catch [[id number]]  (Abnormal) Collected:  11/12/17 0540    Specimen:  Urine from Urine, Clean Catch Updated:  11/12/17 0801     Amphet/Methamphet, Screen Negative     Barbiturates Screen, Urine Negative     Benzodiazepine Screen, Urine Negative     Cocaine Screen, Urine Negative     Opiate Screen Negative     THC, Screen, Urine Positive (A)     Methadone Screen, Urine Negative     Oxycodone Screen, Urine Negative    Narrative:       Negative Thresholds For Drugs Screened:     Amphetamines               500 ng/ml   Barbiturates               200 ng/ml   Benzodiazepines            100 ng/ml   Cocaine                    300 ng/ml   Methadone                  300 ng/ml   Opiates                    300 ng/ml   Oxycodone                  100 ng/ml   THC                        50 ng/ml    The Normal Value for all drugs tested is negative. This report includes final unconfirmed screening results to be used for medical treatment purposes only. Unconfirmed results must  not be used for non-medical purposes such as employment or legal testing. Clinical consideration should be applied to any drug of abuse test, particulary when unconfirmed results are used.          Assessment:  1.  27-year-old  3 para 2 at 35-3/7 weeks gestational age with vaginal bleeding in the third trimester, currently unclear etiology  2.  Suspected ruptured membranes  3.  Tobacco usage  4.  Marijuana usage  5.  Insufficient prenatal care  6.  Fetal status reassuring, category 1    Plan:  1.  Exam is still somewhat equivocal for rupture membranes.  [place] test was negative; however, patient does have a positive PAMG-1.  There was a bloody specimen; however, a literature review shows that this test is still reasonably reliable in the setting of maternal bleeding.  PAMG-1 is shown to have a positive predictive value of 94% and a specificity of 91% in the setting of maternal bleeding (Micaela et al. J Perinatology Med. 2015).  Additionally, the patient is having painful contractions and she has made some cervical change.  She also has a significant leukocytosis of about 20,000, and this is prior to the administration of  steroids.  I suspect that her bleeding is likely of cervical origin in the setting of ruptured membranes.  She also likely appears to be progressing into active  labor.  We'll obtain an ultrasound later today to evaluate the amniotic fluid level as well as for any signs of placental origin of the bleeding.  Overall fetal status is reassuring.  For now we will plan to continue with observation and expectant management.  We may consider labor augmentation for  premature rupture membranes if not making further change.  2.  GBS unknown.  We have started vancomycin for unknown GBS status.  The patient does report a cephalosporin allergy and we do not have susceptibility testing.  3.  I would recommend betamethasone for fetal lung maturity given her gestational age of  35-3/7 weeks' gestation.  4.  I've long conversation with the patient discussing the risks, benefits, and alternatives to allow in her labor to progress at this point.  There is certainly no role for tocolysis in this setting.  I have discussed with the patient the concerns for possible maternal infection/chorioamnionitis in the setting of rupture membranes, and that the general consensus is to proceed with labor if rupture membranes at beyond 34-0/7 weeks gestational age.  We did discuss the risks of iatrogenic prematurity in this setting; however, it is felt that the risks of expectant management outweigh the risks of delivery.  Patient verbalizes understanding.

## 2017-11-12 NOTE — ANESTHESIA PREPROCEDURE EVALUATION
Anesthesia Evaluation     Patient summary reviewed and Nursing notes reviewed   NPO Solid Status: > 8 hours  NPO Liquid Status: < 2 hours     Airway   Mallampati: II  TM distance: >3 FB  Neck ROM: full  Dental - normal exam     Pulmonary - normal exam    breath sounds clear to auscultation  (+) a smoker Current,   Cardiovascular - negative cardio ROS and normal exam    Rhythm: regular  Rate: normal    (-) angina, orthopnea, PND, BROWN      Neuro/Psych- negative ROS  GI/Hepatic/Renal/Endo - negative ROS     Musculoskeletal (-) negative ROS    Abdominal    Substance History - negative use     OB/GYN    (+) Pregnant,         Other - negative ROS                                       Anesthesia Plan    ASA 2     epidural     Anesthetic plan and risks discussed with patient.

## 2017-11-12 NOTE — NURSING NOTE
Pt states her oldest daughters currently live with their father and his mother, there is a current CPS case open due to pt's mother testing positive for methamphetamines, and they were living with her and the pt.   Pt also has a history of self-mutilation at age of 13, has not seen a counselor or psychiatrist for this. She does have an appointment with a counselor later this month. States she feels fine with not suicidal thoughts now.  This FOB is not involved and has recently had suicidal thoughts. He was admitted to a rehab center and told the staff there that if she saw Lisa he did not know what he would do to her, pt is agreeable to being a privacy patient and understands what that means.

## 2017-11-13 LAB
BASOPHILS # BLD AUTO: 0.02 10*3/MM3 (ref 0–0.2)
BASOPHILS NFR BLD AUTO: 0.1 % (ref 0–1.5)
DEPRECATED RDW RBC AUTO: 44.5 FL (ref 37–54)
EOSINOPHIL # BLD AUTO: 0.05 10*3/MM3 (ref 0–0.7)
EOSINOPHIL NFR BLD AUTO: 0.2 % (ref 0.3–6.2)
ERYTHROCYTE [DISTWIDTH] IN BLOOD BY AUTOMATED COUNT: 12.9 % (ref 11.7–13)
HCT VFR BLD AUTO: 30.6 % (ref 35.6–45.5)
HGB BLD-MCNC: 10.1 G/DL (ref 11.9–15.5)
IMM GRANULOCYTES # BLD: 0.14 10*3/MM3 (ref 0–0.03)
IMM GRANULOCYTES NFR BLD: 0.6 % (ref 0–0.5)
LYMPHOCYTES # BLD AUTO: 2.71 10*3/MM3 (ref 0.9–4.8)
LYMPHOCYTES NFR BLD AUTO: 12.3 % (ref 19.6–45.3)
MCH RBC QN AUTO: 31.2 PG (ref 26.9–32)
MCHC RBC AUTO-ENTMCNC: 33 G/DL (ref 32.4–36.3)
MCV RBC AUTO: 94.4 FL (ref 80.5–98.2)
MONOCYTES # BLD AUTO: 1.73 10*3/MM3 (ref 0.2–1.2)
MONOCYTES NFR BLD AUTO: 7.9 % (ref 5–12)
NEUTROPHILS # BLD AUTO: 17.34 10*3/MM3 (ref 1.9–8.1)
NEUTROPHILS NFR BLD AUTO: 78.9 % (ref 42.7–76)
PLATELET # BLD AUTO: 307 10*3/MM3 (ref 140–500)
PMV BLD AUTO: 9.9 FL (ref 6–12)
RBC # BLD AUTO: 3.24 10*6/MM3 (ref 3.9–5.2)
WBC NRBC COR # BLD: 21.99 10*3/MM3 (ref 4.5–10.7)

## 2017-11-13 PROCEDURE — 85025 COMPLETE CBC W/AUTO DIFF WBC: CPT | Performed by: OBSTETRICS & GYNECOLOGY

## 2017-11-13 PROCEDURE — 99231 SBSQ HOSP IP/OBS SF/LOW 25: CPT | Performed by: OBSTETRICS & GYNECOLOGY

## 2017-11-13 RX ADMIN — DOCUSATE SODIUM 100 MG: 100 CAPSULE, LIQUID FILLED ORAL at 17:56

## 2017-11-13 RX ADMIN — Medication: at 09:10

## 2017-11-13 RX ADMIN — IBUPROFEN 800 MG: 800 TABLET ORAL at 17:55

## 2017-11-13 RX ADMIN — Medication 1 TABLET: at 09:57

## 2017-11-13 RX ADMIN — IBUPROFEN 800 MG: 800 TABLET ORAL at 06:50

## 2017-11-13 RX ADMIN — DOCUSATE SODIUM 100 MG: 100 CAPSULE, LIQUID FILLED ORAL at 09:10

## 2017-11-13 RX ADMIN — HYDROCODONE BITARTRATE AND ACETAMINOPHEN 1 TABLET: 5; 325 TABLET ORAL at 04:50

## 2017-11-13 RX ADMIN — BENZOCAINE AND MENTHOL: 20; .5 SPRAY TOPICAL at 09:10

## 2017-11-13 NOTE — PLAN OF CARE
Problem: Postpartum, Vaginal Delivery (Adult)  Intervention: Support Life/Role Transition    17 0800   Coping/Psychosocial Interventions   Parent/Child Attachment Promotion attachment promoted;positive reinforcement provided;role responsibility promoted;parent-child separation minimized;skin-to-skin contact encouraged;rooming-in promoted   Environmental Support calm environment promoted   Coping Strategies   Trust Relationship/Rapport care explained;questions answered;questions encouraged;emotional support provided;empathic listening provided;reassurance provided;thoughts/feelings acknowledged   Social service consult implemented due to history    Goal: Signs and Symptoms of Listed Potential Problems Will be Absent or Manageable (Postpartum, Vaginal Delivery)  Outcome: Ongoing (interventions implemented as appropriate)    17 08   Postpartum, Vaginal Delivery   Problems Assessed (Postpartum Vaginal Delivery) all   Problems Present (Postpartum Vaginal Delivery) pain         Problem: Breastfeeding (Adult,NICU,,Obstetrics,Pediatric)  Intervention: Promote Breast Care/Comfort  Using EBP due to gestation of infant.  Intervention: Promote Effective Breastfeeding  colostrum fed to infant via syringe    Goal: Signs and Symptoms of Listed Potential Problems Will be Absent or Manageable (Breastfeeding)  Outcome: Ongoing (interventions implemented as appropriate)   delivery

## 2017-11-13 NOTE — PLAN OF CARE
Problem: Patient Care Overview (Adult)  Goal: Plan of Care Review  Outcome: Outcome(s) achieved Date Met:  17   Coping/Psychosocial Response Interventions   Plan Of Care Reviewed With patient   Patient Care Overview   Progress progress toward functional goals as expected       Goal: Adult Individualization and Mutuality  Outcome: Outcome(s) achieved Date Met:  17   Individualization   Patient Specific Preferences healthy mom and baby   Patient Specific Goals MARY ANN, pain control, breastfeeding   Patient Specific Interventions epidural, monitor for cervical change   Mutuality/Individual Preferences   What Anxieties, Fears or Concerns Do You Have About Your Health or Care? FOB not involved, psychcosocial issues, THC positive, vaginal bleeding   What Questions Do You Have About Your Health or Care? How long will I be in labor?   What Information Would Help Us Give You More Personalized Care? history of cutting and drug abuse         Problem: Labor (Cervical Ripen, Induct, Augment) (Adult,Obstetrics,Pediatric)  Goal: Signs and Symptoms of Listed Potential Problems Will be Absent or Manageable (Labor)  Outcome: Outcome(s) achieved Date Met:  17   Labor (Cervical Ripen, Induct, Augment)   Problems Assessed (Labor) all   Problems Present (Labor) none         Problem: Fall Risk  (Adult,Obstetrics,Pediatric)  Goal: Identify Related Risk Factors and Signs and Symptoms  Outcome: Outcome(s) achieved Date Met:  17 0652 17   Fall Risk    Fall Risk: Related Risk Factors --  regional anesthesia    Fall Risk: Signs and Symptoms presence of fall risk factors --        Goal: Absence of Maternal Fall  Outcome: Unable to achieve outcome(s) by discharge Date Met:  17   Fall Risk  (Adult,Obstetrics,Pediatric)   Absence of Maternal Fall achieves outcome         Problem:  Anesthesia/Analgesia, Neuraxial (Obstetrics)  Goal: Signs and Symptoms of Listed Potential Problems Will be Absent or Manageable (Anesthesia/Analgesia, Neuraxial)  Outcome: Outcome(s) achieved Date Met:  11/12/17 11/12/17 2201   Anesthesia/Analgesia, Neuraxial   Problems Assessed (Neuraxial Anesthesia/Analgesia, OB) all   Problems Present (Neuraxial Anesthesia/Analgesia, OB) none

## 2017-11-13 NOTE — PROGRESS NOTES
Patient having recurrent deep variable decelerations with her contractions.  I have given an order for amnioinfusion, which is begun.  There is still moderate variability and reactivity.  Overall category 2.  At 7:00 cervical exam was 5-6 centimeters dilated, 80% effaced.  Now the cervical exam is 9 cm dilated, 90% effaced, +1 station.  We will continue with intrauterine resuscitative efforts, including maternal oxygen, lateral positioning, discontinuation of Pitocin, etc.  Hopeful the patient will be able to get to completely dilated and start pushing.

## 2017-11-13 NOTE — L&D DELIVERY NOTE
Procedure: Spontaneous vaginal delivery    Surgeon: Charlie Espinosa MD    Preop diagnosis: 27 year old  at 35-3/7 weeks gestational age in active labor  2.   premature rupture membranes  3.  Vaginal bleeding the third trimester, suspected partial abruption  4.  Maternal tobacco use  5.  Maternal marijuana use    Postop diagnosis: 27 year old  at 35-3/7 weeks gestational age in active labor  2.   premature rupture membranes  3.  Vaginal bleeding the third trimester, suspected partial abruption  4.  Maternal tobacco use  5.  Maternal marijuana use    Indications: Patient presented to labor and delivery at 35-3/7 weeks gestational age complaining of vaginal bleeding.  Initially, she thought that her water broke; however, she found there were large amounts of blood in the bed.  Patient continued to have a moderate amount of vaginal bleeding in labor and delivery, but despite this fetal heart tones remained reassuring and mostly category 1 throughout her hospital stay.  Patient was suspected also have ruptured membranes.  Her PAMG-1 was positive.  Vaginal exam was equivocal due to the large amount of blood on the exam.  She was also making some cervical change and having regular painful contractions.  Ultrasound showed a normal amniotic fluid index and normal.  Fundal placenta; however, there was a unusual appearing hyperechoic lesion within the uterus the left lower quadrant which was not initially well explained.  Given the finding suspicious for rupture membranes, and with active vaginal bleeding at 35+ weeks of gestation, decision made to proceed with labor augmentation.  Patient was started on a course of  steroids.  Patient received an epidural and was comfortable.  She progressed along a normal multiparous labor curve.  Just about an hour prior to delivery, the patient began having some recurrent variable decelerations.  We initially started an amnioinfusion, and I found the patient  had made some rapid cervical change going from about 5-6 and meters dilated and 80% effaced -2 station to completely dilated, completely effaced, +2 station within about 45 minutes.  At this point time, I was called for delivery.    Findings: Female infant, Apgar 9/9, Weight 5 lbs. 2 oz.; no lacerations; there was a large roughly grapefruit sized clot that delivered immediately after the placenta, the clot was old appearing, and this appeared to be consistent with a likely chronic abruption.    Cord gases:  Arterial pH 7.21, PCO2 58.8, PO2 less than 25, bicarbonate 25.3, base excess -5.8, oxygen saturation 22.5%  Venous: PH 7.277, PCO2 47.2, PO2 25.7, bicarbonate 22.0, base excess -5.1, oxygen saturation 39%    Anesthesia: Epidural    EBL: 300 mL    Pathology: Placenta    Complications: None    Procedure: I came to the room when the cervical exam was completely dilated, completely effaced, and +3 station. Under continuous external fetal heart rate monitoring, the patient was encouraged to push.  The patient pushed during a single contraction.  With good maternal effort she delivered a viable female infant. The head presented in the occiput anterior presentation and restituted to add occiput transverse. There was a loose nuchal cord present. The left anterior fetal shoulder was then easily delivered with a gentle downward motion followed by the posterior fetal shoulder, followed by the remainder of the infant without difficulty. The infant was immediately vigorous.  The nurse practitioner from [facility] was present for delivery because of the gestational age.  The oropharynx and nares were bulb-suction and the cord was clamped roughly 45 seconds following delivery. The cord was cut in between and the infant was handed to the nursery attendants.  Cord gases were collected. Cord blood was then collected. The placenta then delivered spontaneously intact, and a three vessel cord was noted. Uterine message and pitocin 20 units  IV was given until the fundus was firm. The cervix, vagina, perineum, and rectum were carefully inspected for lacerations and none were noted.  I administered 600 µg of Cytotec rectally to help with uterine turned and prevent postpartum hemorrhage secondary to the findings suspicious for partial abruption. Counts for needles, sponges, laps and instruments were correct times two at the end of the delivery. There were no sponges left in the vagina. I was present and scrubbed for the entire delivery. There were no major complications. Mother and baby were bonding well at the end of the delivery.

## 2017-11-13 NOTE — PROGRESS NOTES
Discharge Planning Assessment  [facility]     Patient Name: Lisa Dallas  MRN: [id number]  Today's Date: 11/13/2017    Admit Date: 11/12/2017          Discharge Needs Assessment       11/13/17 1326    Living Environment    Lives With alone    Living Arrangements apartment    Home Accessibility no concerns    Stair Railings at Home none    Type of Financial/Environmental Concern no electricity   suppose to be connected on 11/14    Transportation Available family or friend will provide    Living Environment    Provides Primary Care For no one    Quality Of Family Relationships supportive    Able to Return to Prior Living Arrangements yes    Discharge Needs Assessment    Readmission Within The Last 30 Days no previous admission in last 30 days    Equipment Currently Used at Home none    Equipment Needed After Discharge none    Discharge Disposition home or self-care            Discharge Plan       11/13/17 3765    Case Management/Social Work Plan    Plan undetermined: active CPS case    Patient/Family In Agreement With Plan yes    Additional Comments Mother is Lisa Dallas [phone]. Baby is baby girl (Gladis Dallas) [phone]. Met with mother alone in room. Consult to see mother for +THC. Mother and baby drug UA positive for THC. Mother reports she has moved into her own apartment at [address] [place] [address]. Mother reports she has an active CPS case. Mother reports her other two children were removed from her care in September 2017 due to her mother whom they were living with testing positive for methamphetamines.  She reports her two year old is with bio father and her 7 year old is with child's paternal grandmother. She has contact and visitations with both children and plans to get them back at next court date in January 2018. Mother reports she works full time as a  at [employer]. Mother reports part of her stipulations with CPS is she have her own apartment. Mother reports she  signed the lease on Friday and went into labor Sunday. Mother reports [facility] will hook up electricity on Tuesday 11/14. Mother plans to go to her new apt at d/c and her father and step mother plan to help her furnish apartment. Mother reports she does have a car seat but no crib. SW can provided baby with a pack and play at d/c. Mother reports she is a privacy pt due to FOB (name unknown). She reports he is supposed to be in [facility] but she doesn't want him to contact her. She denies being afraid to go home with baby but reports he doesn't know her new address. Mother reports she has an appointment with a counselor at the end of November but she can't remember counselors name. She reports she has it written down and will find it out prior to date. She declines access consult. Mother reports she is agreeable to HANDS referral, faxed referral. Report made to [facility] hotline for +THC, reference number is [id number]. Worker is Jana Estrella [phone]. Spoke with Jana who reports she will have to speak with her supervisor to determine disposition of baby. Mother is unsure of pediatrician at this time. Baby resource guide given to mother as she doesn't have WIC or food stamps. Mother denies any other needs at this time. SW to follow for disposition of baby and meconium results. IZZY Lemus        Discharge Placement     No information found                Demographic Summary       11/13/17 1326    Referral Information    Admission Type inpatient    Reason For Consult discharge planning;mother/baby    Record Reviewed medical record    Contact Information    Permission Granted to Share Information With facility ;family/designee            Functional Status     None            Psychosocial     None            Abuse/Neglect     None            Legal     None            Substance Abuse       11/13/17 1327    Substance Use, Family    Family History Of Substance Use mother    Street Drug/Medication/Inhalant  Type amphetamines    Reported Level of Street Drug/Medication/Inhalant Use addiction/dependency            Patient Forms     None          Kristin Sauceda

## 2017-11-13 NOTE — PROGRESS NOTES
Continued Stay Note  [facility]     Patient Name: Lisa Dallas  MRN: [id number]  Today's Date: 11/13/2017    Admit Date: 11/12/2017          Discharge Plan       11/13/17 9256    Case Management/Social Work Plan    Additional Comments Spoke with Jana CPS worker [phone] who reports she will be meeting with mother today do discuss discharge plans for baby when baby is medically stable. She ask that SW follow up with her on Tuesday 11/14. IZZY Lemus      11/13/17 1434    Case Management/Social Work Plan    Plan undetermined: active CPS case    Patient/Family In Agreement With Plan yes    Additional Comments Mother is Lisa Dallas [phone]. Baby is baby girl (Gladis Dallas) [phone]. Met with mother alone in room. Consult to see mother for +THC. Mother and baby drug UA positive for THC. Mother reports she has moved into her own apartment at [address] [place] [address]. Mother reports she has an active CPS case. Mother reports her other two children were removed from her care in September 2017 due to her mother whom they were living with testing positive for methamphetamines.  She reports her two year old is with bio father and her 7 year old is with child's paternal grandmother. She has contact and visitations with both children and plans to get them back at next court date in January 2018. Mother reports she works full time as a  at [employer]. Mother reports part of her stipulations with CPS is she have her own apartment. Mother reports she signed the lease on Friday and went into labor Sunday. Mother reports [facility] will hook up electricity on Tuesday 11/14. Mother plans to go to her new apt at d/c and her father and step mother plan to help her furnish apartment. Mother reports she does have a car seat but no crib. SW can provided baby with a pack and play at d/c. Mother reports she is a privacy pt due to FOB (name unknown). She reports he is supposed to be in [facility] but  she doesn't want him to contact her. She denies being afraid to go home with baby but reports he doesn't know her new address. Mother reports she has an appointment with a counselor at the end of November but she can't remember counselors name. She reports she has it written down and will find it out prior to date. She declines access consult. Mother reports she is agreeable to HANDS referral, faxed referral. Report made to [facility] hotline for +[facility], reference number is [id number]. Worker is Jana Estrella [phone]. Spoke with Jana who reports she will have to speak with her supervisor to determine disposition of baby. Mother is unsure of pediatrician at this time. Baby resource guide given to mother as she doesn't have WIC or food stamps. Mother denies any other needs at this time. SW to follow for disposition of baby and meconium results. IZZY Lemus              Discharge Codes     None            Kristin Sauceda

## 2017-11-13 NOTE — PROGRESS NOTES
DAILY PROGRESS NOTE    Patient Identification:  Name: Lisa Dallas  Age: 27 y.o.  Sex: female  :  1990  MRN: [id number]               Subjective:  Interval History: Postpartum day one from a spontaneous vaginal delivery.  The baby is doing well.  Mother is feeling well.  Lochia is minimal.  Pain is well controlled with pain medication.     Objective:    Scheduled Meds:  betamethasone acetate-betamethasone sodium phosphate 12 mg Intramuscular Q24H   docusate sodium 100 mg Oral BID   ibuprofen 800 mg Oral Q8H   methylergonovine 200 mcg Intramuscular Once   oxytocin 999 mL/hr Intravenous Once   prenatal (CLASSIC) vitamin 1 tablet Oral Daily     Continuous Infusions:  fentaNYL (2 mcg/ml) and ropivacaine (0.2%) in 250 ml (PREMIX) 12 mL/hr Last Rate: 10 mL/hr (17 1548)   lactated ringers 125 mL/hr Last Rate: 999 mL/hr (17 1454)   oxytocin 2-20 addie-units/min Last Rate: 2 addie-units/min (17 1642)   oxytocin 125 mL/hr    sodium chloride 999 mL      PRN Meds:•  acetaminophen  •  all purpose nipple ointment  •  benzocaine  •  benzocaine-lanolin-aloe vera  •  bisacodyl  •  calcium carbonate  •  HYDROcodone-acetaminophen  •  HYDROcodone-acetaminophen  •  hydrocortisone  •  lanolin  •  magnesium hydroxide  •  ondansetron  •  oxytocin **FOLLOWED BY** oxytocin  •  pramoxine-hydrocortisone  •  sodium chloride    Vital signs in last 24 hours:  Temp:  [97.7 °F (36.5 °C)-98.6 °F (37 °C)] 97.7 °F (36.5 °C)  Heart Rate:  [] 75  Resp:  [16-18] 16  BP: (0-160)/(0-80) 112/68    Intake/Output:    Intake/Output Summary (Last 24 hours) at 17 1041  Last data filed at 17 2346   Gross per 24 hour   Intake             3756 ml   Output             2570 ml   Net             1186 ml       Exam:  General Appearance:    Alert, cooperative, no distress, appears stated age   Lungs:     Clear to auscultation bilaterally, respirations unlabored    Heart:    Regular rate and rhythm, S1 and S2 normal, no  murmur, rub   or gallop   Abdomen:     Soft, Nondistended.  Fundus is firm and nontender.     Extremities:   Extremities Are equal in size with minimal pedal edema    Skin:   Skin color, texture, turgor normal, no rashes or lesions        Data Review:  CBC:   Results from last 7 days  Lab Units 11/13/17  0605   WBC 10*3/mm3 21.99*   RBC 10*6/mm3 3.24*     Lab Results (last 24 hours)     Procedure Component Value Units Date/Time    Blood Gas, Arterial, Cord [[id number]]  (Abnormal) Collected:  11/12/17 2023    Specimen:  Cord Blood Arterial from Umbilical Cord Updated:  11/12/17 2025     Site N/A      N/A        pH, Cord Arterial 7.21 pH Units      pCO2, Cord Arterial 58.8 mmHg      pO2, Cord Arterial <25.2 mmHg      HCO3, Cord Arterial 23.4 mmol/L      Base Exc, Cord Arterial -5.8 mmol/L      Barometric Pressure for Blood Gas 759.4 mmHg      Modality Room Air     FIO2 21 %      O2 Saturation Calculated 22.5 (L) %     Narrative:       [id number] Meter: [id number] : [id number] Cristian Denson    Blood Gas, Venous, Cord [[id number]]  (Abnormal) Collected:  11/12/17 2024    Specimen:  Cord Blood Venous from Umbilical Cord Updated:  11/12/17 2026     Site N/A      N/A        pH, Cord Venous 7.277 pH Units      pCO2, Cord Venous 47.2 mm Hg      pO2, Cord Venous 25.7 mm Hg      HCO3, Cord Venous 22.0 mmol/L      Base Excess, Cord Venous -5.1 mmol/L      O2 Sat, Cord Venous -- %       Direct O2 saturation result not reported at this site.        Barometric Pressure for Blood Gas 763.4 mmHg      Modality Room Air     FIO2 21 %      O2 Saturation Calculated 39.0 (L) %     Narrative:       [id number] Meter: [id number] : [id number] Cristian Janiya    CBC & Differential [[id number]] Collected:  11/13/17 0605    Specimen:  Blood Updated:  11/13/17 0702    Narrative:       The following orders were created for panel order CBC & Differential.  Procedure                               Abnormality         Status                      ---------                               -----------         ------                     CBC Auto Differential[[id number]]        Abnormal            Final result                 Please view results for these tests on the individual orders.    CBC Auto Differential [[id number]]  (Abnormal) Collected:  17    Specimen:  Blood Updated:  17     WBC 21.99 (H) 10*3/mm3      RBC 3.24 (L) 10*6/mm3      Hemoglobin 10.1 (L) g/dL      Hematocrit 30.6 (L) %      MCV 94.4 fL      MCH 31.2 pg      MCHC 33.0 g/dL      RDW 12.9 %      RDW-SD 44.5 fl      MPV 9.9 fL      Platelets 307 10*3/mm3      Neutrophil % 78.9 (H) %      Lymphocyte % 12.3 (L) %      Monocyte % 7.9 %      Eosinophil % 0.2 (L) %      Basophil % 0.1 %      Immature Grans % 0.6 (H) %      Neutrophils, Absolute 17.34 (H) 10*3/mm3      Lymphocytes, Absolute 2.71 10*3/mm3      Monocytes, Absolute 1.73 (H) 10*3/mm3      Eosinophils, Absolute 0.05 10*3/mm3      Basophils, Absolute 0.02 10*3/mm3      Immature Grans, Absolute 0.14 (H) 10*3/mm3         Assessment:  Principal Problem:     delivery, delivered  Active Problems:    Vaginal bleeding in pregnancy, third trimester    Tobacco smoking affecting pregnancy in third trimester    Insufficient prenatal care in third trimester    Drug use affecting pregnancy in third trimester     premature rupture of membranes with onset of labor within 24 hours of rupture in third trimester    1.  Appropriate progress, postpartum day 1.  Likely home tomorrow  2.  The patient was treated for possible chorio yesterday.  She is afebrile today and her fundus is nontender, but her white cell count is elevated at 20,000.  Counseled.  We will observe for now, but recheck a white count in the morning.        Rolando Cunha MD  2017

## 2017-11-13 NOTE — PLAN OF CARE
Problem: Patient Care Overview (Adult)  Goal: Plan of Care Review  Outcome: Ongoing (interventions implemented as appropriate)    11/13/17 0003   Coping/Psychosocial Response Interventions   Plan Of Care Reviewed With patient   Patient Care Overview   Progress improving   Outcome Evaluation   Outcome Summary/Follow up Plan VSS, voids spontaneously, ambulates to BR with assist, SS consult made, breast feeding well        Goal: Adult Individualization and Mutuality  Outcome: Ongoing (interventions implemented as appropriate)    11/13/17 0003   Individualization   Patient Specific Preferences breast feeding   Patient Specific Goals pain management, healthy mom and baby   Patient Specific Interventions PO pain meds offered when available, encourage frequent feeds   Mutuality/Individual Preferences   What Anxieties, Fears or Concerns Do You Have About Your Health or Care? FOB not involved, THC positive   What Questions Do You Have About Your Health or Care? none   What Information Would Help Us Give You More Personalized Care? lives with mom (meth user), open CPS case, h/o cutting, NB is 35w       Goal: Discharge Needs Assessment  Outcome: Ongoing (interventions implemented as appropriate)    11/13/17 0003   Discharge Needs Assessment   Concerns To Be Addressed no discharge needs identified   Readmission Within The Last 30 Days no previous admission in last 30 days   Equipment Needed After Discharge none   Discharge Disposition home or self-care   Self-Care   Equipment Currently Used at Home none   Current Health   Anticipated Changes Related to Illness none   Living Environment   Transportation Available car;family or friend will provide         Problem: Postpartum, Vaginal Delivery (Adult)  Goal: Signs and Symptoms of Listed Potential Problems Will be Absent or Manageable (Postpartum, Vaginal Delivery)  Outcome: Ongoing (interventions implemented as appropriate)    11/13/17 0003   Postpartum, Vaginal Delivery    Problems Assessed (Postpartum Vaginal Delivery) all   Problems Present (Postpartum Vaginal Delivery) none         Problem: Breastfeeding (Adult,NICU,[place],Obstetrics,Pediatric)  Goal: Signs and Symptoms of Listed Potential Problems Will be Absent or Manageable (Breastfeeding)  Outcome: Ongoing (interventions implemented as appropriate)    17 0003   Breastfeeding   Problems Assessed (Breastfeeding) all   Problems Present (Breastfeeding) none

## 2017-11-13 NOTE — LACTATION NOTE
This note was copied from a baby's chart.  P3 35 weeks, she is 13 hrs old and BF x5 with one wet. HGP initiated. Mom knows to insurance pump or if baby not feeding well to pump every 2-3 hrs and feed all ebm to baby. Will call for latch check.

## 2017-11-14 VITALS
OXYGEN SATURATION: 97 % | RESPIRATION RATE: 18 BRPM | DIASTOLIC BLOOD PRESSURE: 63 MMHG | HEART RATE: 65 BPM | SYSTOLIC BLOOD PRESSURE: 107 MMHG | WEIGHT: 204.6 LBS | BODY MASS INDEX: 34.09 KG/M2 | TEMPERATURE: 97.9 F | HEIGHT: 65 IN

## 2017-11-14 PROBLEM — O09.33 INSUFFICIENT PRENATAL CARE IN THIRD TRIMESTER: Status: RESOLVED | Noted: 2017-11-12 | Resolved: 2017-11-14

## 2017-11-14 PROBLEM — O46.93 VAGINAL BLEEDING IN PREGNANCY, THIRD TRIMESTER: Status: RESOLVED | Noted: 2017-11-12 | Resolved: 2017-11-14

## 2017-11-14 LAB
BASOPHILS # BLD AUTO: 0.05 10*3/MM3 (ref 0–0.2)
BASOPHILS NFR BLD AUTO: 0.3 % (ref 0–1.5)
DEPRECATED RDW RBC AUTO: 45.5 FL (ref 37–54)
EOSINOPHIL # BLD AUTO: 0.18 10*3/MM3 (ref 0–0.7)
EOSINOPHIL NFR BLD AUTO: 1.2 % (ref 0.3–6.2)
ERYTHROCYTE [DISTWIDTH] IN BLOOD BY AUTOMATED COUNT: 13 % (ref 11.7–13)
HCT VFR BLD AUTO: 30 % (ref 35.6–45.5)
HGB BLD-MCNC: 9.8 G/DL (ref 11.9–15.5)
IMM GRANULOCYTES # BLD: 0.08 10*3/MM3 (ref 0–0.03)
IMM GRANULOCYTES NFR BLD: 0.5 % (ref 0–0.5)
LYMPHOCYTES # BLD AUTO: 4.74 10*3/MM3 (ref 0.9–4.8)
LYMPHOCYTES NFR BLD AUTO: 31.6 % (ref 19.6–45.3)
MCH RBC QN AUTO: 31.4 PG (ref 26.9–32)
MCHC RBC AUTO-ENTMCNC: 32.7 G/DL (ref 32.4–36.3)
MCV RBC AUTO: 96.2 FL (ref 80.5–98.2)
MONOCYTES # BLD AUTO: 1.16 10*3/MM3 (ref 0.2–1.2)
MONOCYTES NFR BLD AUTO: 7.7 % (ref 5–12)
NEUTROPHILS # BLD AUTO: 8.79 10*3/MM3 (ref 1.9–8.1)
NEUTROPHILS NFR BLD AUTO: 58.7 % (ref 42.7–76)
PLATELET # BLD AUTO: 311 10*3/MM3 (ref 140–500)
PMV BLD AUTO: 9.7 FL (ref 6–12)
RBC # BLD AUTO: 3.12 10*6/MM3 (ref 3.9–5.2)
WBC NRBC COR # BLD: 15 10*3/MM3 (ref 4.5–10.7)

## 2017-11-14 PROCEDURE — 99238 HOSP IP/OBS DSCHRG MGMT 30/<: CPT | Performed by: OBSTETRICS & GYNECOLOGY

## 2017-11-14 PROCEDURE — 85025 COMPLETE CBC W/AUTO DIFF WBC: CPT | Performed by: OBSTETRICS & GYNECOLOGY

## 2017-11-14 RX ADMIN — Medication 1 TABLET: at 08:12

## 2017-11-14 RX ADMIN — DOCUSATE SODIUM 100 MG: 100 CAPSULE, LIQUID FILLED ORAL at 08:12

## 2017-11-14 RX ADMIN — IBUPROFEN 800 MG: 800 TABLET ORAL at 04:06

## 2017-11-14 NOTE — LACTATION NOTE
ASSISTED MOTHER WITH LATCH IN FOOTBALL HOLD ON RIGHT BREAST, CROSS-CRADLE ON LEFT. INFANT NURSED BOTH BREASTS X 5 MIN VIGOROUSLY.-MOTHER BF FIRST CHILD 6 WEEKS, SECOND CHILD 10 MONTHS, MOTHER UNDERSTANDS IMPORTANCE OF INSURANCE PUMPING TO STIMULATE SUPPLY. MOTHER TO CALL FOR LC HELP  AS NEEDED.

## 2017-11-14 NOTE — DISCHARGE SUMMARY
Obstetrical Discharge Form    Primary OB Clinician: Ericka     EDC: 2017, by Ultrasound    Gestational Age:35w3d      Antepartum complications: none except marijuana use     Date of Delivery:   2017      Delivered By:  Dr Espinosa    Delivery Type:  spontaneous vaginal delivery     Tubal Ligation: n/a    Baby:  Gender:  female  1 minute Apgar:  9  5 minute Apgar:  9  Birth weight:  5-2    Anesthesia:  epidural    Intrapartum complications:  chronic abruption with vaginal bleeding and ? PPROM at 35 3/7 weeks     Laceration:  None    Episiotomy:  None    Placenta:  S/I with 3vc.  Clot noted c/w abruption   Exam VSS afebrile  Fundus nontender and firm   Lochia scant   Feeding method: breast  BLOOD TYPE and RH  O+  Rh Immune globulin given: not applicable  WBC= 15.0    HgB= 9.8   Rubella Titre Immune     Discharge Date: 2017      Plan:       Follow-up appointment with DR Barnett  in 6 weeks         Manuel Michael MD

## 2017-11-14 NOTE — PROGRESS NOTES
Continued Stay Note  [facility]     Patient Name: Lisa Dallas  MRN: [id number]  Today's Date: 11/14/2017    Admit Date: 11/12/2017          Discharge Plan       11/14/17 1252    Case Management/Social Work Plan    Plan undetermined. Active CPS Case.     Additional Comments Spoke with CPS worker. , Jana Estrella--[phone]. Explained that there is no meconium on this infant but Drug UA is THC positive. CPS worker states this is an active court case. Disposition is undetermined at this time. She will send letter  with  infant's disposition  to hospital . CCP to follow for CPS determination.......  IZZY Kim              Discharge Codes     None        Expected Discharge Date and Time     Expected Discharge Date Expected Discharge Time    Nov 14, 2017             IZZY Kim

## 2017-11-15 NOTE — PROGRESS NOTES
Continued Stay Note  [facility]     Patient Name: Lisa Dallas  MRN: [id number]  Today's Date: 11/15/2017    Admit Date: 11/12/2017          Discharge Plan       11/15/17 0920    Case Management/Social Work Plan    Additional Comments Met with mother bedside. She reports she met with CPS on Monday and they are going to place baby with maternal grandfather and step grandmother. Mother reports she is going to move in with her father for the time being and have supervised visits while grandfather and grandmother are at work by a family friend. Mother reports car seat in in the room however maternal grandfather is still in need of pack and play for baby to sleep. Provided mother with pack and play, opened it and reviewed how it worked along with safe sleeping. VM left for CPS worker so letter can be faxed and placed on chart. IZZY Lemus              Discharge Codes     None        Expected Discharge Date and Time     Expected Discharge Date Expected Discharge Time    Nov 14, 2017             Kristin Sauceda

## 2017-11-15 NOTE — PROGRESS NOTES
Continued Stay Note  [facility]     Patient Name: Lisa Dallas  MRN: [id number]  Today's Date: 11/15/2017    Admit Date: 11/12/2017          Discharge Plan       11/15/17 1329    Case Management/Social Work Plan    Plan per CPS baby to d/c to maternal grandfather     Additional Comments Spoke with CPS worker Jana who reports plan is for baby to d/c to baby's maternal grandfather and step grandmother. CPS will do a home visit tonight. Per MD baby will d/c tomorrow 11/16/17. Asked that CPS worker please send letter on cabinet letterhead stating who baby can be d/c to. IZZY Lemus              Discharge Codes     None        Expected Discharge Date and Time     Expected Discharge Date Expected Discharge Time    Nov 14, 2017             Kristin Sauceda

## 2017-11-16 NOTE — PROGRESS NOTES
Case Management Discharge Note    Final Note: Letter provided from CPS worker indicating baby d/c to F Phan Dallas and his wife Lorie Dallas ([phone]). Letter placed on chart. Faxed drug UA to CPS worker at [phone]...IZZY Lemus    Discharge Placement     No information found

## 2017-11-21 NOTE — PROGRESS NOTES
Continued Stay Note  [facility]     Patient Name: Lisa Dallas  MRN: [id number]  Today's Date: 11/21/2017    Admit Date: 11/12/2017          Discharge Plan       11/21/17 0857    Case Management/Social Work Plan    Additional Comments Baby's meconium resulted positive for THC. Message left for CPS worker requesting a return call.........  IZZY Kim              Discharge Codes     None        Expected Discharge Date and Time     Expected Discharge Date Expected Discharge Time    Nov 14, 2017             IZZY Kim